# Patient Record
Sex: MALE | URBAN - METROPOLITAN AREA
[De-identification: names, ages, dates, MRNs, and addresses within clinical notes are randomized per-mention and may not be internally consistent; named-entity substitution may affect disease eponyms.]

---

## 2020-08-18 ENCOUNTER — WEB ENCOUNTER (OUTPATIENT)
Dept: URBAN - METROPOLITAN AREA CLINIC 92 | Facility: CLINIC | Age: 40
End: 2020-08-18

## 2020-08-18 RX ORDER — MESALAMINE 375 MG/1
TAKE 4 CAPSULES (1,500 MG) BY ORAL ROUTE ONCE DAILY IN THE MORNING FOR 90 DAYS CAPSULE, EXTENDED RELEASE ORAL 1
Qty: 360 | Refills: 1

## 2022-10-10 ENCOUNTER — OFFICE VISIT (OUTPATIENT)
Dept: URGENT CARE | Facility: URGENT CARE | Age: 42
End: 2022-10-10
Payer: COMMERCIAL

## 2022-10-10 VITALS
SYSTOLIC BLOOD PRESSURE: 133 MMHG | HEART RATE: 54 BPM | TEMPERATURE: 98.1 F | WEIGHT: 205 LBS | DIASTOLIC BLOOD PRESSURE: 87 MMHG | OXYGEN SATURATION: 98 %

## 2022-10-10 DIAGNOSIS — M76.811 ANTERIOR TIBIALIS TENDONITIS OF RIGHT LEG: Primary | ICD-10-CM

## 2022-10-10 PROCEDURE — 99203 OFFICE O/P NEW LOW 30 MIN: CPT | Performed by: PHYSICIAN ASSISTANT

## 2022-10-10 RX ORDER — MESALAMINE 1.2 G/1
TABLET, DELAYED RELEASE ORAL
COMMUNITY
Start: 2022-09-22

## 2022-10-10 NOTE — PROGRESS NOTES
Anterior tibialis tendonitis of right leg  Rest the affected area as much as possible.  Apply ice for 15-20 minutes intermittently as needed and especially after any offending activity. Hot packs are better for muscle spasms and cramping. Daily stretching as tolerated.  As pain recedes, begin normal activities slowly as tolerated.  Consider Physical Therapy after 6 weeks if symptoms not better with conservative care.      Okay to take acetaminophen 500 mg- 2 tabs (Total of 1000 mg) every 8 hrs   Okay to take ibuprofen 200 mg- 3 tabs (Total of 600 mg) every 6 hours      Abraham Mack PA-C  [facility]    Subjective   42 year old who presents to clinic today for the following health issues:    Urgent Care and Foot Pain       HPI     Pain History:  When did you first notice your pain? - Acute Pain   Where in your body do you have pain? Musculoskeletal problem/pain  Onset/Duration: 1 day without known injury- Came on gradually.   Description  Location: left ankle on the anterior side   Joint Swelling: No  Redness: No  Pain: YES  Warmth: No  Intensity:  Mild to moderate  Progression of Symptoms:  same  Accompanying signs and symptoms:   Fevers: No  Numbness/tingling/weakness: No  History  Trauma to the area: No  Recent illness:  No  Previous similar problem: No  Previous evaluation:  No  Precipitating or alleviating factors:  Aggravating factors include: walking and climbing stairs  Therapies tried and outcome: rest/inactivity and acetaminophen    Patient states that he has been runny more lately (6-8 miles per day).      Review of Systems   Review of Systems   See HPI    Objective    Temp: 98.1  F (36.7  C) Temp src: Tympanic BP: 133/87 Pulse: 54     SpO2: 98 %       Physical Exam   Physical Exam  Constitutional:       General: He is not in acute distress.     Appearance: Normal appearance. He is normal weight. He is not ill-appearing, toxic-appearing or diaphoretic.   HENT:      Head:  Normocephalic and atraumatic.   Cardiovascular:      Rate and Rhythm: Normal rate.      Pulses: Normal pulses.   Pulmonary:      Effort: Pulmonary effort is normal. No respiratory distress.   Musculoskeletal:        Legs:       Comments: Patient has movement pain in the area shown above without local tenderness.  There does not seem to be any deformities, swelling, erythema.   Neurological:      General: No focal deficit present.      Mental Status: He is alert and oriented to person, place, and time. Mental status is at baseline.      Gait: Gait normal.   Psychiatric:         Mood and Affect: Mood normal.         Behavior: Behavior normal.         Thought Content: Thought content normal.         Judgment: Judgment normal.          No results found for this or any previous visit (from the past 24 hour(s)).

## 2025-05-01 ENCOUNTER — TRANSCRIBE ORDERS (OUTPATIENT)
Dept: CARDIOLOGY | Facility: HOSPITAL | Age: 45
End: 2025-05-01

## 2025-05-01 DIAGNOSIS — K51.00 PANCOLITIS (H): Primary | ICD-10-CM
